# Patient Record
Sex: MALE
[De-identification: names, ages, dates, MRNs, and addresses within clinical notes are randomized per-mention and may not be internally consistent; named-entity substitution may affect disease eponyms.]

---

## 2022-09-24 ENCOUNTER — HOSPITAL ENCOUNTER (EMERGENCY)
Dept: HOSPITAL 5 - ED | Age: 47
Discharge: HOME | End: 2022-09-24
Payer: SELF-PAY

## 2022-09-24 VITALS — DIASTOLIC BLOOD PRESSURE: 72 MMHG | SYSTOLIC BLOOD PRESSURE: 123 MMHG
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
## 2022-09-24 DIAGNOSIS — S51.811A: Primary | ICD-10-CM
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
## 2022-09-24 PROCEDURE — 12034 INTMD RPR S/TR/EXT 7.6-12.5: CPT

## 2022-09-24 PROCEDURE — 99282 EMERGENCY DEPT VISIT SF MDM: CPT

## 2022-09-24 NOTE — EMERGENCY DEPARTMENT REPORT
ED Laceration HPI





- HPI


Chief Complaint: Laceration/Recheck/Suture


Stated Complaint: CUT ON ARM


Time Seen by Provider: 09/24/22 16:29


Occurred When: Today


Location: Upper Extremity


Severity: mild


Tetanus Status: Up to Date


Laceration Symptoms: Yes Pain, No Foreign Body Sensation, No Numbness, No 

Weakness


Other History: 47-year-old male was changing out a toilet upstairs when it broke

resulting in laceration to his right forearm and bleeding.  Presents emergency 

department for wound evaluation and closure.





ED Review of Systems


ROS: 


Stated complaint: CUT ON ARM


Other details as noted in HPI





Comment: All other systems reviewed and negative





ED Past Medical Hx





- Past Medical History


Previous Medical History?: No





- Surgical History


Past Surgical History?: No





- Medications


Home Medications: 


                                Home Medications











 Medication  Instructions  Recorded  Confirmed  Last Taken  Type


 


Chlorhexidine Gluconate [Hibiclens] 10 ml TP BID #236 ml 09/24/22  Unknown Rx














Laceration Physical Exam





- Exam


General: 


Vital signs noted. No distress. Alert and acting appropriately.





Wound Length (cm): 12


Laceration Location: Upper Extremity


Laceration Exam: Yes Normal Distal CMS, No Foreign Body, No Exposed Tendon, 

Vessel, or Nerve, No Tendon Injury





ED Course


                                   Vital Signs











  09/24/22





  15:49


 


Temperature 98.2 F


 


Pulse Rate 88


 


Respiratory 20





Rate 


 


Blood Pressure 114/80





[Right] 


 


O2 Sat by Pulse 98





Oximetry 














- Laceration /Wound Repair


  ** Right Arm


Wound Length (cm): 12


Wound's Depth, Shape: linear


Wound Explored: clean


Irrigated w/ Saline (ccs): 50


Anesthesia: 1% Lidocaine


Volume Anesthetic (ccs): 6


Wound Debrided: minimal


Wound Repaired With: sutures


Suture Size/Type: 4:0, nylon


Number of Sutures: 10


Layer Closure?: Yes


Deep Layer Suture Size/Type: 4:0, dexon (Vicryl)


Sterile Dressing Applied?: Yes


Critical care attestation.: 


If time is entered above; I have spent that time in minutes in the direct care 

of this critically ill patient, excluding procedure time.








ED Disposition


Clinical Impression: 


 Forearm laceration





Disposition: 01 HOME / SELF CARE / HOMELESS


Is pt being admited?: No


Does the pt Need Aspirin: No


Condition: Stable


Instructions:  Laceration Care, Adult, Sutured Wound Care


Prescriptions: 


Chlorhexidine Gluconate [Hibiclens] 10 ml TP BID #236 ml


Referrals: 


FERMIN BARTON MD [Primary Care Provider] - 3-5 Days